# Patient Record
Sex: FEMALE | Race: BLACK OR AFRICAN AMERICAN | NOT HISPANIC OR LATINO | URBAN - METROPOLITAN AREA
[De-identification: names, ages, dates, MRNs, and addresses within clinical notes are randomized per-mention and may not be internally consistent; named-entity substitution may affect disease eponyms.]

---

## 2017-06-16 ENCOUNTER — ALLSCRIPTS OFFICE VISIT (OUTPATIENT)
Dept: OTHER | Facility: OTHER | Age: 67
End: 2017-06-16

## 2017-08-18 ENCOUNTER — ALLSCRIPTS OFFICE VISIT (OUTPATIENT)
Dept: OTHER | Facility: OTHER | Age: 67
End: 2017-08-18

## 2017-08-21 ENCOUNTER — GENERIC CONVERSION - ENCOUNTER (OUTPATIENT)
Dept: OTHER | Facility: OTHER | Age: 67
End: 2017-08-21

## 2017-10-23 NOTE — PROGRESS NOTES
Assessment  1  Vitiligo (709 01) (L80)   2  Psoriasis (696 1) (L40 9)   3  Screening for skin condition (V82 0) (Z13 89)    Plan   · Follow-up PRN Evaluation and Treatment  Follow-up  Status: Complete  Done:  [id number]    Discussion/Summary  Discussion Summary- [facility]:   Assessment #1: Psoriasis  Care Plan:   Clear on methotrexate and prednisone  Patient to continue follow-up with Dr Ricardo Escalona we'll manage her treatment if psoriasis flares or other consideration will probably happy to follow back with her  Assessment #2: Vitiligo  Care Plan:   We discussed the use of monobenzyl ether of hydroquinone as a depigmenting agents especially for her face patient to consider this and will let us know if she wishes to go ahead with treatment  Assessment #3: Screening for dermatologic disorders  Care Plan:   Nothing else of concern noted follow-up as needed  Chief Complaint  Chief Complaint Free Text Note Form: psoriasis fup  History of Present Illness  HPI: 31-year-old female presents for follow-up for psoriasis and vitiligo  Patient is also seen Dr Ricardo Escalona who is managing her methotrexate at this time  Patient also was diagnosed with polymyalgia rheumatica and is presently on prednisone 5 mg daily  Notes her psoriasis is completely resolved  She also discussed depigmentation therapy on her face      Review of Systems  Complete Female Dermatology [facility] Patient:   Constitutional: Denies constitutional symptoms  Eyes: Denies eye symptoms  ENT:  denies ear symptoms, nasal symptoms, mouth or throat symptoms  Cardiovascular: Denies cardiovascular symptoms  Respiratory: Denies respiratory symptoms  Gastrointestinal: Denies gastrointestinal symptoms  Musculoskeletal: Denies musculoskeletal symptoms  Integumentary: Denies skin, hair and nail symptoms  Neurological: Denies neurologic symptoms  Psychiatric: Denies psychiatric symptoms  Endocrine: Denies endocrine symptoms  Hematologic/Lymphatic: Denies hematologic symptoms  Active Problems  1  Lichen sclerosus (139 6) (L90 0)   2  Psoriasis (696 1) (L40 9)   3  Screening for skin condition (V82 0) (Z13 89)   4  Vitiligo (709 01) (L80)    Past Medical History  1  History of hypertension (V12 59) (Z86 79)   2  History of hypothyroidism (V12 29) (Z86 39)   3  History of rheumatoid arthritis (V13 4) (Z87 39)   4  Vitiligo (709 01) (L80)  Past Medical History Reviewed- Derm:   The past medical history was reviewed  Surgical History  Surgical History Reviewed Trinh Gennaro- Derm:   Surgical History reviewed      Family History  Family History Reviewed- Derm:   Family History was reviewed      Social History  Social History Reviewed Alplaus Gennaro- Derm: The social history was reviewed      Current Meds   1  Betamethasone Dipropionate 0 05 % External Ointment; APPLY SPARINGLY TO   AFFECTED AREA(S) ONCE DAILY; Therapy: [id number] to (Last Rx:16Jun2017)  Requested for: 14TZE3859 Ordered   2  Calcipotriene 0 005 % External Ointment; APPLY AND GENTLY MASSAGE INTO   AFFECTED AREA(S) TWICE DAILY; Therapy: 46IRO7351 to (Last Rx:16Jun2017)  Requested for: 08VQJ4642 Ordered   3  Clobetasol Propionate 0 05 % External Ointment; Therapy: 80CNF2914 to Recorded   4  Ergocalciferol 8000 UNIT/ML Oral Solution; Therapy: 27PXW6736 to Recorded   5  Folic Acid 1 MG Oral Tablet; Therapy: 18QYX5747 to Recorded   6  Losartan Potassium-HCTZ 100-12 5 MG Oral Tablet; Therapy: 52MOR0688 to Recorded   7  Lumigan 0 01 % Ophthalmic Solution; INSTILL 1 DROP INTO BOTH EYES ONCE DAILY   IN THE EVENING; Therapy: 52STF4169 to Recorded   8  Methotrexate 2 5 MG Oral Tablet; TAKE 5 TABLETS PER WEEK; Therapy: 83XKP4476 to Recorded   9  Metoprolol Succinate  MG Oral Tablet Extended Release 24 Hour; TAKE 1 TABLET   DAILY; Therapy: 73ARQ6890 to Recorded   10  Naproxen 500 MG Oral Tablet; TAKE 1 TABLET EVERY 12 HOURS DAILY;     Therapy: 92KFV2835 to Recorded 11  PredniSONE 5 MG Oral Tablet; Therapy: 57IAP5109 to Recorded   12  Synthroid 100 MCG Oral Tablet; TAKE 1 TABLET DAILY; Therapy: 54KMT2144 to Recorded  Medication List Reviewed: The medication list was reviewed and updated today  Physical Exam    Constitutional   General appearance: Appears healthy and well developed  Lymphatic   No visible disturbance  Musculoskeletal   Digits and nails: No clubbing, cyanosis or edema  Cutaneous and nail exam normal     Skin   Scalp skin texture and hair distribution: Abnormal     Head: Abnormal     Neck: Abnormal     Chest: Abnormal     Abdomen: Abnormal     Back: Abnormal     Right upper extremity: Abnormal     Left upper extremity: Abnormal     Right lower extremity: Abnormal     Left lower extremity: Abnormal     Neuro/Psych   Alert and oriented x 3  Displays comfort and cooperation during encounterl  Affect is normal     Finding Hypopigmentation loss of pigmentation noted on 90% body surface area psoriasis completely resolved        Signatures   Electronically signed by : ALEXEI June ; Aug 18 2017  2:21PM EST                       (Author)

## 2022-12-06 ENCOUNTER — TELEPHONE (OUTPATIENT)
Dept: OTHER | Facility: OTHER | Age: 72
End: 2022-12-06

## 2022-12-06 NOTE — TELEPHONE ENCOUNTER
Patient is asking to be removed from Dr Meg Arroyo patient list as she states she no longer lives in area